# Patient Record
Sex: FEMALE | Race: WHITE
[De-identification: names, ages, dates, MRNs, and addresses within clinical notes are randomized per-mention and may not be internally consistent; named-entity substitution may affect disease eponyms.]

---

## 2018-01-27 ENCOUNTER — HOSPITAL ENCOUNTER (EMERGENCY)
Dept: HOSPITAL 59 - ER | Age: 38
Discharge: HOME | End: 2018-01-27
Payer: COMMERCIAL

## 2018-01-27 DIAGNOSIS — J98.01: Primary | ICD-10-CM

## 2018-01-27 LAB
FLUBV AG SPEC QL IA: NEGATIVE
LEAD BLD-MCNC: NEGATIVE UG/DL

## 2018-01-27 PROCEDURE — 99283 EMERGENCY DEPT VISIT LOW MDM: CPT

## 2018-01-27 PROCEDURE — 87400 INFLUENZA A/B EACH AG IA: CPT

## 2018-01-27 PROCEDURE — 94640 AIRWAY INHALATION TREATMENT: CPT

## 2018-01-27 RX ADMIN — PREDNISONE ONE MG: 20 TABLET ORAL at 22:51

## 2018-01-27 RX ADMIN — PREDNISONE ONE MG: 20 TABLET ORAL at 22:49

## 2018-01-27 NOTE — EMERGENCY DEPARTMENT RECORD
History of Present Illness





- General


Chief Complaint: Difficulty Breathing


Stated Complaint: BRONCHITIS,DAB


Time Seen by Provider: 18 22:44


Source: Patient


Mode of Arrival: Ambulatory


Limitations: No limitations





- History of Present Illness


Initial Comments: 





37 yo female returns to ED for symptoms of difficulty in breathing symptoms.  

Patient reports that she was recent treated for bronchitis symptoms 8 days ago, 

just completed Prednisone and Zithromax 5 days ago.  Patient denies fevers, 

chills, or vomiting symptoms, and the patient denies health problems at his 

baseline.


MD Complaint: Shortness of breath


Onset/Timin


-: Days(s)


Severity: Moderate


Severity scale (1-10): 6


Quality: Aching, Dull


Consistency: Intermittent, Getting worse


Improves With: Nothing


Worsens With: Coughing, Exertion, Inspiration


Known History Of: Other


Context: Recent illness, Recent URI


Associated Symptoms: Cough, Pain with inspiration, Sputum production


Treatments Prior to Arrival: None





- Related Data


Home Oxygen Therapy: No


 Previous Rx's











 Medication  Instructions  Recorded


 


Prednisone [Prednisone 20Mg] 20 mg PO BID #15 tab 18











 Allergies











Allergy/AdvReac Type Severity Reaction Status Date / Time


 


No Known Allergies Allergy   Unverified 03/15/16 14:16














Travel Screening





- Travel/Exposure Within Last 30 Days


Have you traveled within the last 30 days?: No





Review of Systems


Constitutional: Denies: Chills, Fever, Malaise, Night sweats


Eyes: Denies: Eye discharge, Eye pain


ENT: Reports: Congestion.  Denies: Ear pain, Epistaxis


Respiratory: Reports: Cough, Dyspnea, Wheezes


Cardiovascular: Denies: Chest pain, Dyspnea on exertion


Endocrine: Denies: Fatigue, Heat or cold intolerance


Gastrointestinal: Denies: Abdominal pain, Nausea, Vomiting


Genitourinary: Denies: Incontinence, Retention


Musculoskeletal: Denies: Arthralgia, Back pain, Gout, Joint swelling


Skin: Denies: Bruising, Change in color


Neurological: Denies: Abnormal gait, Confusion, Headache, Seizure


Psychiatric: Denies: Anxiety


Hematological/Lymphatic: Denies: Anemia, Blood Clots





Past Medical History





- SOCIAL HISTORY


Smoking Status: Never smoker


Alcohol Use: Rare


Drug Use: None





- RESPIRATORY


Hx Respiratory Disorders: No





- CARDIOVASCULAR


Hx Cardio Disorders: No





- NEURO


Hx Neuro Disorders: No





- GI


Hx GI Disorders: No





- 


Hx Genitourinary Disorders: No





- ENDOCRINE


Hx Endocrine Disorders: No





- MUSCULOSKELETAL


Hx Musculoskeletal Disorders: No





- HEMATOLOGY/ONCOLOGY


Hx Hematology/Oncology Disorders: No





Family Medical History


Any Significant Family History?: No





Physical Exam





- General


General Appearance: Alert, Oriented x3, Cooperative, Moderate distress


Limitations: No limitations





- Head


Head exam: Atraumatic, Normocephalic, Normal inspection


Head exam detail: negative: Abrasion, Contusion, Simon's sign, General 

tenderness, Hematoma, Laceration





- Eye


Eye exam: Normal appearance.  negative: Conjunctival injection, Periorbital 

swelling, Periorbital tenderness





- ENT


Ear exam: negative: Auricular hematoma, Auricular trauma


Nasal Exam: negative: Active bleeding, Discharge, Dried blood, Foreign body


Mouth exam: negative: Drooling, Laceration, Muffled voice, Tongue elevation





- Neck


Neck exam: Normal inspection.  negative: Meningismus, Tenderness





- Respiratory


Respiratory exam: Decreased breath sounds, Respiratory distress.  negative: 

Rhonchi, Stridor





- Cardiovascular


Cardiovascular Exam: Regular rate, Normal rhythm, Normal heart sounds





- GI/Abdominal


GI/Abdominal exam: Soft.  negative: Rebound, Rigid, Tenderness





- Rectal


Rectal exam: Deferred





- 


 exam: Deferred





- Extremities


Extremities exam: Normal inspection.  negative: Calf tenderness, Pedal edema, 

Tenderness





- Back


Back exam: Denies: CVA tenderness (R), CVA tenderness (L)





- Neurological


Neurological exam: Alert, Normal gait, Oriented X3





- Psychiatric


Psychiatric exam: Normal affect, Normal mood





- Skin


Skin exam: Normal color.  negative: Abrasion


Type of lesion: negative: abrasion





Course





 Vital Signs











  18





  22:36


 


Pulse Rate 108 H


 


Respiratory 26 H





Rate 


 


Blood Pressure 130/85


 


Pulse Ox 97














- Reevaluation(s)


Reevaluation #1: 





18 23:01


Patient reassessed following duoneb, BS are greatly improved.  Will obtain 

influenza swab to determine if Tamiflu may be needed as well.


Reevaluation #2: 





18 23:24


Influenza negative.





Patient was updated on all results, reports that she is feeling much better.  

Patient appears stable for discharge at this time on prednisone.





Disposition


Disposition: Discharge


Clinical Impression: 


 Bronchospasm





Disposition: Home, Self-Care


Condition: (2) Stable


Instructions:  Bronchospasm (ED)


Additional Instructions: 


Return to ED if your symptoms worsen or if you have any concerns.


Prednisone as directed.


Follow-up with your family doctor in 3-5 days as directed.


Prescriptions: 


Prednisone [Prednisone 20Mg] 20 mg PO BID #15 tab


Forms:  Patient Portal Access


Time of Disposition: 23:26





Quality





- Quality Measures


Quality Measures: N/A





- Blood Pressure Screening


Does Patient Have Any of the Following: No


Blood Pressure Classification: Pre-Hypertensive BP Reading


Systolic Measurement: 130


Diastolic Measurement: 85


Screening for High Blood Pressure: < Pre-Hypertensive BP, F/U Documented > [

]


Pre-Hypertensive Follow-up Interventions: Follow-up with rescreen every year.

## 2018-02-09 ENCOUNTER — HOSPITAL ENCOUNTER (EMERGENCY)
Dept: HOSPITAL 59 - ER | Age: 38
Discharge: HOME | End: 2018-02-09
Payer: COMMERCIAL

## 2018-02-09 DIAGNOSIS — J45.21: Primary | ICD-10-CM

## 2018-02-09 PROCEDURE — 94640 AIRWAY INHALATION TREATMENT: CPT

## 2018-02-09 PROCEDURE — 99284 EMERGENCY DEPT VISIT MOD MDM: CPT

## 2018-02-09 PROCEDURE — 99283 EMERGENCY DEPT VISIT LOW MDM: CPT

## 2018-02-09 PROCEDURE — 71046 X-RAY EXAM CHEST 2 VIEWS: CPT

## 2018-02-09 NOTE — EMERGENCY DEPARTMENT RECORD
History of Present Illness





- General


Chief Complaint: Shortness of breath


Stated Complaint: SANDRITA


Time Seen by Provider: 18 16:40


Source: Patient


Mode of Arrival: Wheelchair


Limitations: No limitations





- History of Present Illness


Initial Comments: 


The patient is here due to developing a cough with congestion and SOB a day 

ago. She recently was diagnosed with Bronchitis and placed on oral Steroids and 

has just finished them. Now the cough and congestion has returned. She is 

coughing up some clear sputum and denies any fever, chills, HA, ST, and body 

aches. The patient denies any hx of asthma or any medical issues.





MD Complaint: Cough, Shortness of breath


Onset/Timin


-: Days(s)


Quality: Aching


Consistency: Intermittent


Improves With: Nothing


Worsens With: Coughing


Known History Of: Other


Associated Symptoms: Cough, Sputum production


Treatments Prior to Arrival: None





- Related Data


 Previous Rx's











 Medication  Instructions  Recorded


 


Doxycycline Monohydrate [Mondoxyne 100 mg PO BID #14 capsule 18





Nl]  


 


Prednisone [Prednisone 20Mg] 20 mg PO ASDIR #15 tab 18











 Allergies











Allergy/AdvReac Type Severity Reaction Status Date / Time


 


No Known Allergies Allergy   Unverified 03/15/16 14:16














Travel Screening





- Travel/Exposure Within Last 30 Days


Have you traveled within the last 30 days?: No





Review of Systems


Constitutional: Denies: Chills, Fever


Eyes: Denies: Eye discharge


ENT: Reports: Congestion


Respiratory: Reports: Cough, Dyspnea





Past Medical History





- SOCIAL HISTORY


Smoking Status: Never smoker


Alcohol Use: None


Drug Use: None





- RESPIRATORY


Hx Respiratory Disorders: Yes


Hx Bronchitis: Yes





- CARDIOVASCULAR


Hx Cardio Disorders: No





- NEURO


Hx Neuro Disorders: No





- GI


Hx GI Disorders: No





- 


Hx Genitourinary Disorders: No





- ENDOCRINE


Hx Endocrine Disorders: No





- MUSCULOSKELETAL


Hx Musculoskeletal Disorders: No





- PSYCH


Hx Psych Problems: No





- HEMATOLOGY/ONCOLOGY


Hx Hematology/Oncology Disorders: No





Family Medical History


Any Significant Family History?: No





Physical Exam





- General


General Appearance: Alert, Oriented x3, Cooperative, No acute distress





- Head


Head exam: Atraumatic, Normocephalic, Normal inspection





- Eye


Eye exam: Normal appearance, PERRL





- ENT


ENT exam: Normal exam, Mucous membranes moist, Normal external ear exam, Normal 

orophraynx, TM's normal bilaterally


Throat exam: Normal inspection.  negative: Tonsillar erythema, Tonsillar exudate





- Neck


Neck exam: Normal inspection, Full ROM.  negative: Lymphadenopathy, Tenderness





- Respiratory


Respiratory exam: Decreased breath sounds (at the bases.), Wheezes (at the 

bases mildly.).  negative: Normal lung sounds bilaterally, Accessory muscle use

, Respiratory distress





- Cardiovascular


Cardiovascular Exam: Regular rate, Normal rhythm, Normal heart sounds





- GI/Abdominal


GI/Abdominal exam: Soft, Normal bowel sounds.  negative: Tenderness





- Extremities


Extremities exam: Normal inspection, Full ROM, Normal capillary refill.  

negative: Tenderness





Course





 Vital Signs











  18





  16:31


 


Temperature 98.8 F


 


Pulse Rate 110 H


 


Respiratory 20





Rate 


 


Blood Pressure 136/85


 


Pulse Ox 94 L














- Reevaluation(s)


Reevaluation #1: 


The patient is doing better at this time. Her breathing has improved and her 

SANDRITA has mostly resolved. On exam her lung aeration has improved and there is 

better aeration bilaterally. There is still very mild course exp wheezes in the 

lower lobes. The lungs are much improved from presentation. I did discuss the 

neg xrays with the patient and the need for F/U.


18 17:48








Medical Decision Making





- Data Complexity


MDM Data: X-Ray Ordered and/or Reviewed





- Radiology Data


Radiology results: Report reviewed (CXR: Neg.)





Disposition


Disposition: Discharge


Clinical Impression: 


Asthmatic bronchitis


Qualifiers:


 Asthma severity: mild Asthma persistence: intermittent Asthma complication type

: with acute exacerbation Qualified Code(s): J45.21 - Mild intermittent asthma 

with (acute) exacerbation





Disposition: Home, Self-Care


Condition: (2) Stable


Instructions:  Bronchospasm (ED)


Additional Instructions: 


Please continue your home inhaller and also the Doxycycline and Prednisone. 

Please see your PCP early next week for recheck. Return to the ER for any fever

, worsening cough, congestion, or wheezing.


Prescriptions: 


Doxycycline Monohydrate [Mondoxyne Nl] 100 mg PO BID #14 capsule


Prednisone [Prednisone 20Mg] 20 mg PO ASDIR #15 tab


Forms:  Patient Portal Access


Time of Disposition: 18:03





Quality





- Quality Measures


Quality Measures: N/A





- Blood Pressure Screening


View Details: Yes


Does Patient Have Any of the Following: No


Blood Pressure Classification: Pre-Hypertensive BP Reading


Systolic Measurement: 131


Diastolic Measurement: 84


Screening for High Blood Pressure: < Pre-Hypertensive BP, F/U Documented > [

]


Pre-Hypertensive Follow-up Interventions: Referral to alternative/primary care 

provider.

## 2018-02-10 NOTE — RADIOLOGY REPORT
DATE:  02/09/2018. 



EXAM:  TWO-VIEW, CHEST.



HISTORY:  Difficulty breathing.



COMPARISON:  01/19/2018.



TECHNIQUE:  Frontal and lateral views of the chest were performed.  



FINDINGS:  Heart size is normal.  Lung fields are clear.  No infiltrate or 
pleural effusion. 



IMPRESSION:  

NEGATIVE CHEST EXAMINATION.



JOB NUMBER:  [id number]
MTDD

## 2018-02-24 ENCOUNTER — HOSPITAL ENCOUNTER (EMERGENCY)
Dept: HOSPITAL 59 - ER | Age: 38
Discharge: HOME | End: 2018-02-24
Payer: COMMERCIAL

## 2018-02-24 DIAGNOSIS — F17.210: ICD-10-CM

## 2018-02-24 DIAGNOSIS — J45.901: Primary | ICD-10-CM

## 2018-02-24 LAB
ALBUMIN SERPL-MCNC: 4.5 G/DL (ref 4–5)
ALBUMIN/GLOB SERPL: 1.6 {RATIO} (ref 1.1–1.8)
ALP SERPL-CCNC: 55 U/L (ref 35–104)
ALT SERPL-CCNC: 15 U/L (ref ?–33)
ANION GAP SERPL CALC-SCNC: 12 MMOL/L (ref 7–16)
AST SERPL-CCNC: 14 U/L (ref 10–35)
BASOPHILS NFR BLD: 1 % (ref 0–6)
BILIRUB SERPL-MCNC: 0.4 MG/DL (ref 0.2–1)
BUN SERPL-MCNC: 10 MG/DL (ref 6–20)
CO2 SERPL-SCNC: 25 MMOL/L (ref 22–29)
CREAT SERPL-MCNC: 0.6 MG/DL (ref 0.5–0.9)
EOSINOPHIL NFR BLD: 13 % (ref 0–6)
ERYTHROCYTE [DISTWIDTH] IN BLOOD BY AUTOMATED COUNT: 12.9 % (ref 11.5–14.5)
EST GLOMERULAR FILTRATION RATE: > 60 ML/MIN
GLOBULIN SER-MCNC: 2.8 GM/DL (ref 1.4–4.8)
GLUCOSE SERPL-MCNC: 114 MG/DL (ref 74–109)
HCT VFR BLD CALC: 43.3 % (ref 35–47)
HGB BLD-MCNC: 14.9 GM/DL (ref 11.6–16)
LYMPHOCYTES NFR BLD: 26 % (ref 16–45)
MCH RBC QN AUTO: 30 PG (ref 27–33)
MCHC RBC AUTO-ENTMCNC: 34.4 G/DL (ref 32–36)
MCV RBC AUTO: 87.1 FL (ref 81–97)
MONOCYTES NFR BLD: 10 % (ref 0–9)
NEUTROPHILS NFR BLD AUTO: 50 % (ref 47–80)
PLATELET # BLD: 326 K/UL (ref 130–400)
PMV BLD AUTO: 10 FL (ref 7.4–10.4)
PROT SERPL-MCNC: 7.3 G/DL (ref 6.6–8.7)
RBC # BLD AUTO: 4.97 M/UL (ref 3.8–5.4)
WBC # BLD AUTO: 8.5 K/UL (ref 4.2–12.2)

## 2018-02-24 PROCEDURE — 99283 EMERGENCY DEPT VISIT LOW MDM: CPT

## 2018-02-24 PROCEDURE — 71046 X-RAY EXAM CHEST 2 VIEWS: CPT

## 2018-02-24 PROCEDURE — 85027 COMPLETE CBC AUTOMATED: CPT

## 2018-02-24 PROCEDURE — 80053 COMPREHEN METABOLIC PANEL: CPT

## 2018-02-24 PROCEDURE — 94640 AIRWAY INHALATION TREATMENT: CPT

## 2018-02-24 PROCEDURE — 99284 EMERGENCY DEPT VISIT MOD MDM: CPT
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
## 2018-02-25 NOTE — RADIOLOGY REPORT
DATE:  02/24/2018. 



EXAM:  CHEST, TWO VIEWS.



HISTORY:  Bronchitis.



TECHNIQUE:  Two views of the chest were obtained.



FINDINGS:  The heart is not enlarged.  The lungs and pleural spaces are clear.  



IMPRESSION:  

NO ACUTE CARDIOPULMONARY ABNORMALITY.



JOB NUMBER:  [id number]
MTDD